# Patient Record
Sex: MALE | Race: ASIAN | NOT HISPANIC OR LATINO
[De-identification: names, ages, dates, MRNs, and addresses within clinical notes are randomized per-mention and may not be internally consistent; named-entity substitution may affect disease eponyms.]

---

## 2019-01-14 ENCOUNTER — TRANSCRIPTION ENCOUNTER (OUTPATIENT)
Age: 54
End: 2019-01-14

## 2021-06-23 ENCOUNTER — EMERGENCY (EMERGENCY)
Facility: HOSPITAL | Age: 56
LOS: 1 days | Discharge: HOME | End: 2021-06-23
Attending: EMERGENCY MEDICINE | Admitting: EMERGENCY MEDICINE
Payer: MEDICAID

## 2021-06-23 VITALS
OXYGEN SATURATION: 100 % | SYSTOLIC BLOOD PRESSURE: 133 MMHG | HEART RATE: 51 BPM | HEIGHT: 69.5 IN | TEMPERATURE: 97 F | DIASTOLIC BLOOD PRESSURE: 83 MMHG | RESPIRATION RATE: 16 BRPM

## 2021-06-23 VITALS
RESPIRATION RATE: 16 BRPM | HEART RATE: 59 BPM | SYSTOLIC BLOOD PRESSURE: 148 MMHG | OXYGEN SATURATION: 100 % | DIASTOLIC BLOOD PRESSURE: 89 MMHG | TEMPERATURE: 97 F

## 2021-06-23 LAB
ALBUMIN SERPL ELPH-MCNC: 4.4 G/DL — SIGNIFICANT CHANGE UP (ref 3.3–5)
ALP SERPL-CCNC: 70 U/L — SIGNIFICANT CHANGE UP (ref 40–120)
ALT FLD-CCNC: 18 U/L — SIGNIFICANT CHANGE UP (ref 4–41)
ANION GAP SERPL CALC-SCNC: 7 MMOL/L — SIGNIFICANT CHANGE UP (ref 7–14)
AST SERPL-CCNC: 18 U/L — SIGNIFICANT CHANGE UP (ref 4–40)
BASOPHILS # BLD AUTO: 0.03 K/UL — SIGNIFICANT CHANGE UP (ref 0–0.2)
BASOPHILS NFR BLD AUTO: 0.7 % — SIGNIFICANT CHANGE UP (ref 0–2)
BILIRUB SERPL-MCNC: 0.6 MG/DL — SIGNIFICANT CHANGE UP (ref 0.2–1.2)
BUN SERPL-MCNC: 24 MG/DL — HIGH (ref 7–23)
CALCIUM SERPL-MCNC: 9.5 MG/DL — SIGNIFICANT CHANGE UP (ref 8.4–10.5)
CHLORIDE SERPL-SCNC: 106 MMOL/L — SIGNIFICANT CHANGE UP (ref 98–107)
CO2 SERPL-SCNC: 26 MMOL/L — SIGNIFICANT CHANGE UP (ref 22–31)
CREAT SERPL-MCNC: 1.21 MG/DL — SIGNIFICANT CHANGE UP (ref 0.5–1.3)
EOSINOPHIL # BLD AUTO: 0.19 K/UL — SIGNIFICANT CHANGE UP (ref 0–0.5)
EOSINOPHIL NFR BLD AUTO: 4.1 % — SIGNIFICANT CHANGE UP (ref 0–6)
GLUCOSE SERPL-MCNC: 108 MG/DL — HIGH (ref 70–99)
HCT VFR BLD CALC: 44.2 % — SIGNIFICANT CHANGE UP (ref 39–50)
HGB BLD-MCNC: 13.4 G/DL — SIGNIFICANT CHANGE UP (ref 13–17)
IANC: 1.67 K/UL — SIGNIFICANT CHANGE UP (ref 1.5–8.5)
IMM GRANULOCYTES NFR BLD AUTO: 0.2 % — SIGNIFICANT CHANGE UP (ref 0–1.5)
LYMPHOCYTES # BLD AUTO: 2.3 K/UL — SIGNIFICANT CHANGE UP (ref 1–3.3)
LYMPHOCYTES # BLD AUTO: 50 % — HIGH (ref 13–44)
MCHC RBC-ENTMCNC: 25.5 PG — LOW (ref 27–34)
MCHC RBC-ENTMCNC: 30.3 GM/DL — LOW (ref 32–36)
MCV RBC AUTO: 84.2 FL — SIGNIFICANT CHANGE UP (ref 80–100)
MONOCYTES # BLD AUTO: 0.4 K/UL — SIGNIFICANT CHANGE UP (ref 0–0.9)
MONOCYTES NFR BLD AUTO: 8.7 % — SIGNIFICANT CHANGE UP (ref 2–14)
NEUTROPHILS # BLD AUTO: 1.67 K/UL — LOW (ref 1.8–7.4)
NEUTROPHILS NFR BLD AUTO: 36.3 % — LOW (ref 43–77)
NRBC # BLD: 0 /100 WBCS — SIGNIFICANT CHANGE UP
NRBC # FLD: 0 K/UL — SIGNIFICANT CHANGE UP
PLATELET # BLD AUTO: 184 K/UL — SIGNIFICANT CHANGE UP (ref 150–400)
POTASSIUM SERPL-MCNC: 4.3 MMOL/L — SIGNIFICANT CHANGE UP (ref 3.5–5.3)
POTASSIUM SERPL-SCNC: 4.3 MMOL/L — SIGNIFICANT CHANGE UP (ref 3.5–5.3)
PROT SERPL-MCNC: 7.3 G/DL — SIGNIFICANT CHANGE UP (ref 6–8.3)
RBC # BLD: 5.25 M/UL — SIGNIFICANT CHANGE UP (ref 4.2–5.8)
RBC # FLD: 14.6 % — HIGH (ref 10.3–14.5)
SARS-COV-2 RNA SPEC QL NAA+PROBE: SIGNIFICANT CHANGE UP
SODIUM SERPL-SCNC: 139 MMOL/L — SIGNIFICANT CHANGE UP (ref 135–145)
TROPONIN T, HIGH SENSITIVITY RESULT: 8 NG/L — SIGNIFICANT CHANGE UP
TROPONIN T, HIGH SENSITIVITY RESULT: 8 NG/L — SIGNIFICANT CHANGE UP
WBC # BLD: 4.6 K/UL — SIGNIFICANT CHANGE UP (ref 3.8–10.5)
WBC # FLD AUTO: 4.6 K/UL — SIGNIFICANT CHANGE UP (ref 3.8–10.5)

## 2021-06-23 PROCEDURE — 71046 X-RAY EXAM CHEST 2 VIEWS: CPT | Mod: 26

## 2021-06-23 PROCEDURE — 93010 ELECTROCARDIOGRAM REPORT: CPT

## 2021-06-23 PROCEDURE — 99285 EMERGENCY DEPT VISIT HI MDM: CPT | Mod: 25

## 2021-06-23 RX ORDER — ASPIRIN/CALCIUM CARB/MAGNESIUM 324 MG
162 TABLET ORAL ONCE
Refills: 0 | Status: COMPLETED | OUTPATIENT
Start: 2021-06-23 | End: 2021-06-23

## 2021-06-23 RX ADMIN — Medication 162 MILLIGRAM(S): at 10:42

## 2021-06-23 NOTE — ED PROVIDER NOTE - ATTENDING CONTRIBUTION TO CARE
Pt p/w episodes of fleeting L sided chest pain that radiate down L side of body. Non exertional, no assoc nausea/vomiting, diaphoresis, sob, palp, cough, f/c/s. Pt well appearing states the pain is shock like and last for just a few seconds and resolve spontaneously. Very atypical for ACS, EKG reassuring, trops WNL, CBC WNL, not consistent with w/ PE or dissection, will d/c with PCP f/u and return precautions.

## 2021-06-23 NOTE — ED PROVIDER NOTE - CLINICAL SUMMARY MEDICAL DECISION MAKING FREE TEXT BOX
56 y/o male with a hx of HLD, Pre DM presents to the ER c/o 1 day of left sided chest pain. Pt states pain started at 6am, pain is left sided radiating down to his left leg, non exertional, lasts a few seconds at a time. Pt is well appearing, NAD, will check labs, CXR, EKG, follow up PMD.

## 2021-06-23 NOTE — SBIRT NOTE ADULT - NSSBIRTALCPOSREINDET_GEN_A_CORE
Provided SBIRT services: Full screen Negative. Screening results were reviewed with the patient and patient was provided information about healthy guidelines and potential negative consequences associated with level of risk.

## 2021-06-23 NOTE — ED ADULT TRIAGE NOTE - CHIEF COMPLAINT QUOTE
Pt presents to ED for intermittent chest pain x "couple of hours." Pt also c/o intermittent sharp pain down his left leg when chest pain starts. Denies SOB, palpitations, dizziness and lightheadedness at this time. Denies pmhx.

## 2021-06-23 NOTE — ED PROVIDER NOTE - OBJECTIVE STATEMENT
54 y/o male with a hx of HLD, Pre DM presents to the ER c/o 1 day of left sided chest pain. Pt states pain started at 6am, pain is left sided radiating down to his left leg, non exertional, lasts a few seconds at a time.  Pt reports similar pain in the past.  Pt denies fevers, chills, cough, sob, weakness, dizziness back pain, numbness, tingling.  Pt is not on meds. 56 y/o Male with a hx of HLD, Pre DM presents to the ER c/o 1 day of left sided chest pain. Pt states pain started at 6am, pain is left sided radiating down to his left leg, non exertional, lasts a few seconds at a time.  Pt reports similar pain in the past.  Pt denies fevers, chills, cough, sob, weakness, dizziness back pain, numbness, tingling.  Pt is not on meds.

## 2021-06-23 NOTE — ED PROVIDER NOTE - NSFOLLOWUPINSTRUCTIONS_ED_ALL_ED_FT
Follow up with your Doctor in 1-2 days.    Follow up with cardiology in 1-2 days.    Return to the ER for any persistent/worsening or new symptoms, chest pain, shortness of breath, palpitations, dizziness or any concerning symptoms.

## 2021-06-23 NOTE — ED PROVIDER NOTE - PROGRESS NOTE DETAILS
NILE Canales: pt feels better ambulating without difficulty.  Results reviewed with patient.  Discharge reviewed and discussed with patient.

## 2021-06-23 NOTE — ED PROVIDER NOTE - PATIENT PORTAL LINK FT
You can access the FollowMyHealth Patient Portal offered by [facility] by registering at the following website: [email or web address]. By joining [email or web address]’s FollowMyHealth portal, you will also be able to view your health information using other applications (apps) compatible with our system.

## 2021-06-23 NOTE — ED ADULT NURSE NOTE - OBJECTIVE STATEMENT
Pt arriving to intake room 9 A&OX4 ambulatory c/o intermittent sharp left-sided CP starting this AM. PMH HLD, pre-diabetes. Pt denies active CP. States pain started when making coffee, denies N/V, diaphoresis. Pt appears in NAD. Resp even and unlabored. 20g IV placed in LAC. Labs drawn and sent. Awaiting CXR.

## 2021-06-30 ENCOUNTER — APPOINTMENT (OUTPATIENT)
Dept: CARDIOLOGY | Facility: HOSPITAL | Age: 56
End: 2021-06-30

## 2021-06-30 ENCOUNTER — EMERGENCY (EMERGENCY)
Facility: HOSPITAL | Age: 56
LOS: 1 days | Discharge: HOME | End: 2021-06-30
Attending: STUDENT IN AN ORGANIZED HEALTH CARE EDUCATION/TRAINING PROGRAM | Admitting: STUDENT IN AN ORGANIZED HEALTH CARE EDUCATION/TRAINING PROGRAM
Payer: MEDICAID

## 2021-06-30 ENCOUNTER — NON-APPOINTMENT (OUTPATIENT)
Age: 56
End: 2021-06-30

## 2021-06-30 VITALS
SYSTOLIC BLOOD PRESSURE: 143 MMHG | DIASTOLIC BLOOD PRESSURE: 91 MMHG | WEIGHT: 175 LBS | RESPIRATION RATE: 16 BRPM | HEIGHT: 69 IN | BODY MASS INDEX: 25.92 KG/M2 | OXYGEN SATURATION: 97 % | HEART RATE: 68 BPM | TEMPERATURE: 97 F

## 2021-06-30 VITALS
HEIGHT: 69.5 IN | SYSTOLIC BLOOD PRESSURE: 128 MMHG | HEART RATE: 79 BPM | RESPIRATION RATE: 17 BRPM | TEMPERATURE: 98 F | DIASTOLIC BLOOD PRESSURE: 93 MMHG

## 2021-06-30 DIAGNOSIS — R07.9 CHEST PAIN, UNSPECIFIED: ICD-10-CM

## 2021-06-30 DIAGNOSIS — R21 RASH AND OTHER NONSPECIFIC SKIN ERUPTION: ICD-10-CM

## 2021-06-30 DIAGNOSIS — E78.5 HYPERLIPIDEMIA, UNSPECIFIED: ICD-10-CM

## 2021-06-30 PROCEDURE — 99283 EMERGENCY DEPT VISIT LOW MDM: CPT

## 2021-06-30 RX ADMIN — Medication 100 MILLIGRAM(S): at 20:49

## 2021-06-30 NOTE — REASON FOR VISIT
[Other: ____] : [unfilled] [FreeTextEntry1] : Mr Connelly is a 54 yo male with a hx of HLD, who is here post hospitalization.  He had a recent hospitalization on 6/23/21 for left sided CP.  Chest pain radiated to his knees and back up.  Pain was 5/10, lasted moments, with exertion.  He has had no further issues with exercise at this time.  He had normal trops, ekg, and labs and no events while in ED.  Discharged with follow up.  He has a PCP.  No family hx of heart disease.  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

## 2021-06-30 NOTE — ED PROVIDER NOTE - OBJECTIVE STATEMENT
56 Y/O M w/ no PMH presents to ER with rash to RT lower extremity. Visiting friends in [facility] 2 weeks ago and noticed a brown tick on LT wrist. A week later noticed a rash on RT ankle. Has continued to grow in size. Not itchy or painful. Applying topical cream with minimal relief. Denies fever, nausea/vomiting, dizziness, headache, chest pain, SOB, weakness, palpitations or syncope

## 2021-06-30 NOTE — ED PROVIDER NOTE - PATIENT PORTAL LINK FT
You can access the FollowMyHealth Patient Portal offered by [facility] by registering at the following website: [email or web address]. By joining [employer]’s FollowMyHealth portal, you will also be able to view your health information using other applications (apps) compatible with our system.

## 2021-06-30 NOTE — REVIEW OF SYSTEMS
[Skin Lesions] : skin lesion(s): [Fever] : no fever [Headache] : no headache [Chills] : no chills [Blurry Vision] : no blurred vision [Seeing Double (Diplopia)] : no diplopia [Sore Throat] : no sore throat [Sinus Pressure] : no sinus pressure [SOB] : no shortness of breath [Chest Discomfort] : no chest discomfort [Leg Claudication] : no intermittent leg claudication [Palpitations] : no palpitations [Cough] : no cough [Wheezing] : no wheezing [Abdominal Pain] : no abdominal pain [Nausea] : no nausea [Vomiting] : no vomiting [Change in Appetite] : no change in appetite [Diarrhea] : diarrhea [Urinary Frequency] : no change in urinary frequency [Hematuria] : no hematuria [Pain During Urination] : no dysuria [Joint Pain] : no joint pain [Joint Swelling] : no joint swelling [Joint Stiffness] : no joint stiffness [Itching] : no itching [Dizziness] : no dizziness [Numbness (Hypoesthesia)] : no numbness [Convulsions] : no convulsions [Weakness] : no weakness [Confusion] : no confusion was observed [Under Stress] : not under stress [Suicidal] : not suicidal [Easy Bleeding] : no tendency for easy bleeding [Easy Bruising] : no tendency for easy bruising

## 2021-06-30 NOTE — ED PROVIDER NOTE - NS ED ROS FT
Constitutional: (-) fever   Head: Normal cephalic, Atraumatic  Eyes/ENT: (-) vision changes  Cardiovascular: (-) chest pain, (-) wheezing  Respiratory: (-) cough, (-) shortness of breath  Gastrointestinal: (-) vomiting, (-) diarrhea, (-) abdominal pain  : (-) dysuria   Musculoskeletal: (-) back pain  Integumentary: (+) rash RT ankle  Neurological: (-)loc  Allergic/Immunologic: (-) pruritus

## 2021-06-30 NOTE — ASSESSMENT
[FreeTextEntry1] : Mr Connelly is a 56 yo male with a hx of HLD, who is here post hospitalization.  He had a recent hospitalization on 6/23/21 for left sided CP.  Chest pain radiated to his knees and back up.  Pain was 5/10, lasted moments, with exertion.  He has had no further issues with exercise at this time.  He had normal trops, ekg, and labs and no events while in ED.  Discharged with follow up.  He has a PCP.  No family hx of heart disease.  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  \par \par Chest pain\par - echo, stress test\par \par HLD\par - check lipid panel, tsh, a1c\par - will ask to get pcp records on next visit\par \par Rash on R leg\par - rash has continued to expand and is painful, concern over lyme disease\par - we discussed options of follow up with ED vs PCP, patient is aggreable to f/u in ED, \par - concerned for possible lyme infection vs other tick illness\par \par RTC 2 months\par \par

## 2021-06-30 NOTE — ED ADULT TRIAGE NOTE - CHIEF COMPLAINT QUOTE
Pt. ambulatory to triage with c/o redness and edema to right lower leg x 1week, ambulating without difficulty. Pt. ambulatory to triage with c/o redness and edema to right lower leg x 1week, ambulating without difficulty, denies trauma.

## 2021-06-30 NOTE — ED PROVIDER NOTE - CLINICAL SUMMARY MEDICAL DECISION MAKING FREE TEXT BOX
54 Y/O M w/ no PMH presents to ER with rash to RT lower extremity.   Will contact with results of lab work  Begin on prophylactic treatment for Lyme disease   Return precautions

## 2021-06-30 NOTE — ED PROVIDER NOTE - PHYSICAL EXAMINATION
Vital signs reviewed.   CONSTITUTIONAL: Well-appearing; well-nourished; in no apparent distress. Non-toxic appearing.   HEAD: Normocephalic, atraumatic.  EYES: Normal conjunctiva and no sclera injection noted  ENT: normal nose; no rhinorrhea  NECK: No midline tenderness. FAROM  CARD: Normal S1, S2  RESP: Normal chest excursion with respiration; breath sounds clear and equal bilaterally.  EXT/MS: moves all extremities; distal pulses are normal, no pedal edema.  SKIN: 7 x 3 CM rash with clearing at center and erythematous border. Non-tender, blanchable, non warm, no drainage.  NEURO: Awake, alert, oriented x 3, no gross deficits, CN II-XII grossly intact, no motor or sensory deficit noted.  PSYCH: Normal mood; appropriate affect.

## 2021-06-30 NOTE — PHYSICAL EXAM
[Well Developed] : well developed [Well Nourished] : well nourished [Normal Conjunctiva] : normal conjunctiva [Normal Venous Pressure] : normal venous pressure [Carotid Bruit] : carotid bruit [Normal S1, S2] : normal S1, S2 [No Murmur] : no murmur [S3] : S3 [Clear Lung Fields] : clear lung fields [No Respiratory Distress] : no respiratory distress  [Soft] : abdomen soft [Non Tender] : non-tender [Tenderness] : tenderness [Normal Gait] : normal gait [Gait - Sufficient for Exercise Testing] : gait - sufficient for exercise testing [No Edema] : no edema [Rash] : rash [Moves all extremities] : moves all extremities [Memory Deficit] : memory deficit [Alert and Oriented] : alert and oriented [Normal memory] : normal memory [Cognitive Impairment] : cognitive impairment [de-identified] : vericose veins on left leg [de-identified] : red rash on front of ankle, targetoid lesion

## 2021-06-30 NOTE — ED PROVIDER NOTE - CHIEF COMPLAINT
The patient is a 55y Male complaining of  The patient is a 55y Male complaining of Rt leg redness/swelling

## 2021-06-30 NOTE — ED ADULT NURSE NOTE - CHIEF COMPLAINT QUOTE
Pt. ambulatory to triage with c/o redness and edema to right lower leg x 1week, ambulating without difficulty, denies trauma.

## 2021-06-30 NOTE — ED PROVIDER NOTE - NSFOLLOWUPINSTRUCTIONS_ED_ALL_ED_FT
Rash    A rash is a change in the color of the skin. A rash can also change the way your skin feels. There are many different conditions and factors that can cause a rash, most of which are not dangerous. Make sure to follow up with your primary care physician or a dermatologist as instructed by your health care provider.    SEEK IMMEDIATE MEDICAL CARE IF YOU HAVE ANY OF THE FOLLOWING SYMPTOMS: fever, blisters, a rash inside your mouth, vaginal or anal pain, or altered mental status.    Please take Doxycycline twice daily for 10 days.

## 2021-07-01 LAB
B BURGDOR C6 AB SER-ACNC: NEGATIVE — SIGNIFICANT CHANGE UP
B BURGDOR IGG+IGM SER-ACNC: 0.09 INDEX — SIGNIFICANT CHANGE UP (ref 0.01–0.89)

## 2021-09-01 ENCOUNTER — APPOINTMENT (OUTPATIENT)
Dept: CARDIOLOGY | Facility: HOSPITAL | Age: 56
End: 2021-09-01

## 2023-01-11 ENCOUNTER — APPOINTMENT (OUTPATIENT)
Dept: OPHTHALMOLOGY | Facility: CLINIC | Age: 58
End: 2023-01-11

## 2024-04-24 ENCOUNTER — APPOINTMENT (OUTPATIENT)
Dept: CARDIOLOGY | Facility: CLINIC | Age: 59
End: 2024-04-24
